# Patient Record
Sex: MALE | Race: WHITE | URBAN - METROPOLITAN AREA
[De-identification: names, ages, dates, MRNs, and addresses within clinical notes are randomized per-mention and may not be internally consistent; named-entity substitution may affect disease eponyms.]

---

## 2017-03-03 ENCOUNTER — TELEPHONE (OUTPATIENT)
Dept: NEUROLOGY | Facility: CLINIC | Age: 57
End: 2017-03-03

## 2017-03-03 NOTE — TELEPHONE ENCOUNTER
Prior Authorization Retail Medication Request  Medication/Dose: Lidocaine 5% Patch Place 2 patches onto the skin every 24 hours  Diagnosis and ICD code: Idiopathic Progressive Neuropathy G60.3  New/Renewal/Insurance Change PA:   Previously Tried and Failed Therapies:     Insurance ID (if provided):   Insurance Phone (if provided):     Any additional info from fax request:     If you received a fax notification from an outside Pharmacy:  Pharmacy Name: [facility]   Pharmacy #:[id number]  Pharmacy Fax:[phone]

## 2017-03-10 NOTE — TELEPHONE ENCOUNTER
PRIOR AUTHORIZATION DENIED    Medication: Lidocaine 5% Patch- denied    Denial Date: 3/10/2017    Denial Rational: script is denied because pt's diagnosis is not an approved indication, approved indications are, Pain associated with post-herpetic neuralgia, pain associated with diabetic neuropathy, pain associated with cancer-related neuropathy.            Appeal Information:

## 2017-03-30 NOTE — TELEPHONE ENCOUNTER
Medication Appeal Initiation    We have initiated an appeal for the requested medication:  Medication: Lidocaine 5% Patch- appeal initiated  Appeal Start Date:  3/30/2017  Insurance Company: [employer] - Phone [phone] Fax [phone]  Comments:  Faxed appeal to [phone] included is letter of medical necessity, Dr notes and denial letter

## 2017-04-10 ENCOUNTER — TELEPHONE (OUTPATIENT)
Dept: NEUROLOGY | Facility: CLINIC | Age: 57
End: 2017-04-10

## 2018-02-08 DIAGNOSIS — G60.3 IDIOPATHIC PROGRESSIVE POLYNEUROPATHY: ICD-10-CM

## 2018-02-09 RX ORDER — LIDOCAINE 50 MG/G
PATCH TOPICAL
Qty: 60 PATCH | Refills: 4 | Status: SHIPPED | OUTPATIENT
Start: 2018-02-09 | End: 2018-02-28

## 2018-02-09 NOTE — TELEPHONE ENCOUNTER
Received refill request for Lidoderm patches from [facility]. Patient has not been seen since 12/2016.

## 2018-02-28 ENCOUNTER — OFFICE VISIT (OUTPATIENT)
Dept: NEUROLOGY | Facility: CLINIC | Age: 58
End: 2018-02-28
Payer: COMMERCIAL

## 2018-02-28 VITALS
WEIGHT: 231.7 LBS | HEIGHT: 71 IN | BODY MASS INDEX: 32.44 KG/M2 | HEART RATE: 81 BPM | SYSTOLIC BLOOD PRESSURE: 144 MMHG | DIASTOLIC BLOOD PRESSURE: 92 MMHG

## 2018-02-28 DIAGNOSIS — G60.9 IDIOPATHIC SMALL FIBER PERIPHERAL NEUROPATHY: Primary | ICD-10-CM

## 2018-02-28 DIAGNOSIS — G60.9 IDIOPATHIC SMALL FIBER PERIPHERAL NEUROPATHY: ICD-10-CM

## 2018-02-28 LAB
HBA1C MFR BLD: 5.4 % (ref 4.3–6)
VIT B12 SERPL-MCNC: 467 PG/ML (ref 193–986)

## 2018-02-28 RX ORDER — LIDOCAINE 50 MG/G
2 PATCH TOPICAL EVERY 24 HOURS
Qty: 60 PATCH | Refills: 6 | Status: SHIPPED | OUTPATIENT
Start: 2018-02-28

## 2018-02-28 RX ORDER — DULOXETIN HYDROCHLORIDE 30 MG/1
30 CAPSULE, DELAYED RELEASE ORAL 2 TIMES DAILY
Qty: 60 CAPSULE | Refills: 3 | Status: SHIPPED | OUTPATIENT
Start: 2018-02-28 | End: 2018-07-05

## 2018-02-28 ASSESSMENT — PAIN SCALES - GENERAL: PAINLEVEL: NO PAIN (0)

## 2018-02-28 NOTE — MR AVS SNAPSHOT
After Visit Summary   2/28/2018    Bernaeb Moreno    MRN: [id number]           Patient Information     Date Of Birth          1960        Visit Information        Provider Department      2/28/2018 8:30 AM Pablo Zarate MD [facility]        Today's Diagnoses     Idiopathic small fiber peripheral neuropathy    -  1       Follow-ups after your visit        Follow-up notes from your care team     Return in about 1 year (around 2/28/2019).      Your next 10 appointments already scheduled     Feb 28, 2018  9:15 AM CST   LAB with  LAB   [facility] ([facility])    [address] [place] [id number] [phone]           Please do not eat 10-12 hours before your appointment if you are coming in fasting for labs on lipids, cholesterol, or glucose (sugar). This does not apply to pregnant women. Water, hot tea and black coffee (with nothing added) are okay. Do not drink other fluids, diet soda or chew gum.            Feb 27, 2019  8:00 AM CST   (Arrive by 7:45 AM)   Return Neuropathy Visit with Pablo Zarate MD   [facility] ([facility])    [address] [place] [id number] [phone]              Future tests that were ordered for you today     Open Future Orders        Priority Expected Expires Ordered    Hemoglobin A1c Routine  2/28/2019 2/28/2018    Vitamin B12 Routine  2/28/2019 2/28/2018            Who to contact     Please call your clinic at [phone] to:    Ask questions about your health    Make or cancel appointments    Discuss your medicines    Learn about your test results    Speak to your doctor            Additional Information About Your Visit        Brain Sentry Information     [employer]entry is an electronic gateway that provides easy, online access to your medical records. With [employer], you can request a clinic appointment, read your test results, renew a prescription  "or communicate with your care team.     To sign up for Click[employer] visit the website at [email or web address]   You will be asked to enter the access code listed below, as well as some personal information. Please follow the directions to create your username and password.     Your access code is: [email or web address]  Expires: 2018  6:30 AM     Your access code will  in 90 days. If you need help or a new code, please contact your [facility] or call [phone] for assistance.        Care EveryWhere ID     This is your Care EveryWhere ID. This could be used by other organizations to access your [facility] medical records  [id number]        Your Vitals Were     Pulse Height BMI (Body Mass Index)             81 1.803 m (5' 11\") 32.32 kg/m2          Blood Pressure from Last 3 Encounters:   18 (!) 144/92   16 138/84   16 (!) 158/96    Weight from Last 3 Encounters:   18 105.1 kg (231 lb 11.2 oz)   14 99.8 kg (220 lb)   14 97.5 kg (215 lb)                 Today's Medication Changes          These changes are accurate as of 18  9:02 AM.  If you have any questions, ask your nurse or doctor.               Start taking these medicines.        Dose/Directions    DULoxetine 30 MG EC capsule   Commonly known as:  CYMBALTA   Used for:  Idiopathic small fiber peripheral neuropathy   Started by:  Pablo Zarate MD        Dose:  30 mg   Take 1 capsule (30 mg) by mouth 2 times daily   Quantity:  60 capsule   Refills:  3         These medicines have changed or have updated prescriptions.        Dose/Directions    * lidocaine 5 % Patch   Commonly known as:  LIDODERM   This may have changed:  Another medication with the same name was added. Make sure you understand how and when to take each.   Used for:  Idiopathic progressive polyneuropathy   Changed by:  Pablo Zarate MD        Dose:  2 patch   Place 2 patches onto the skin every 12 hours   Quantity: "  60 patch   Refills:  3       * lidocaine 5 % Patch   Commonly known as:  LIDODERM   This may have changed:  You were already taking a medication with the same name, and this prescription was added. Make sure you understand how and when to take each.   Used for:  Idiopathic small fiber peripheral neuropathy   Changed by:  Pablo Zarate MD        Dose:  2 patch   Place 2 patches onto the skin every 24 hours   Quantity:  60 patch   Refills:  6       * Notice:  This list has 2 medication(s) that are the same as other medications prescribed for you. Read the directions carefully, and ask your doctor or other care provider to review them with you.         Where to get your medicines      These medications were sent to [facility]/pharmacy #[id number] - [place], [place] - [address], [place] [address]     Phone:  [phone]     DULoxetine 30 MG EC capsule    lidocaine 5 % Patch                Primary Care Provider Office Phone # Fax #    Rommel Gasca [phone]       [facility] [address] [facility] [address]        Equal Access to Services     [facility] HospitalFRANCOIS AH: Hadii aad ku hadasho Soomaali, waaxda luqadaha, qaybta kaalmada adeegyada, waxay idiin hayaan lee ann khhenry salas . So [facility] [phone].    ATENCIÓN: Si habla español, tiene a shaffer disposición servicios gratuitos de asistencia lingüística. [facility] [phone].    We comply with applicable federal civil rights laws and [place] laws. We do not discriminate on the basis of race, color, national origin, age, disability, sex, sexual orientation, or gender identity.            Thank you!     Thank you for choosing [facility]UROLOGY  for your care. Our goal is always to provide you with excellent care. Hearing back from our patients is one way we can continue to improve our services. Please take a few minutes to complete the written survey that you may receive in the mail  after your visit with us. Thank you!             Your Updated Medication List - Protect others around you: Learn how to safely use, store and throw away your medicines at www.disposemymeds.org.          This list is accurate as of 2/28/18  9:02 AM.  Always use your most recent med list.                   Brand Name Dispense Instructions for use Diagnosis    AMBIEN PO      Take 10 mg by mouth    Sensory disturbance       DULoxetine 30 MG EC capsule    CYMBALTA    60 capsule    Take 1 capsule (30 mg) by mouth 2 times daily    Idiopathic small fiber peripheral neuropathy       * lidocaine 5 % Patch    LIDODERM    60 patch    Place 2 patches onto the skin every 12 hours    Idiopathic progressive polyneuropathy       * lidocaine 5 % Patch    LIDODERM    60 patch    Place 2 patches onto the skin every 24 hours    Idiopathic small fiber peripheral neuropathy       * Notice:  This list has 2 medication(s) that are the same as other medications prescribed for you. Read the directions carefully, and ask your doctor or other care provider to review them with you.

## 2018-02-28 NOTE — PROGRESS NOTES
Neuropathy history:   Bernabe Moreno is a 57 year old man with a probable mild distal small fiber neuropathy. Onset was at least 2012 or 2013. At that time he developed paresthesias (tingling) and dysesthesias (pins) in both feet. Course has been stable to very slowly worsening. NCS 6/14 did not show any clear evidence of large fiber involvement. Prior work up included borderline low B12. Other studies as below. He has taken oral B12 replacement since June 2014. Prior symptomatic medications include gabapentin (summer 2014). He thinks he took 300 mg TID. It was not helpful and so he stopped it in September 2014. In early December 2014 he started Amitriptyline. He thinks this was probably been helpful, but not completely sure and so stopped it. He has used lidocaine patches at night. He has found these helpful.     Interval history:  I last saw him 12/6/16. Over the last year he is largely unchanged. He continues to have paresthesias and burning mainly on the top of the feet. The early evening and bed time are the most problematic, The affected area remains just the distal 1/2 of both feet. The severity and location is stable. He has reduced his exercise over the last year and has gained weight. He stopped the lidocaine patches a few weeks ago. He though these were helpful. He stopped them because he ran out. He stopped taking B12.     Prior pertinent laboratory work-up:  5/14: B12 253. Normal copper, zinc, MMA, paraneoplastic panel ([place]), CRP, ESR, SSa, SSb, serum/urine IF, TSH, Hba1c  4/16/15: GTT fasting 95, 2 hr 142 (N )  5/15: Skin biopsy showed IENFD of 5.05 at the foot (slightly reduced) and 7.3 at the calf (WNL).     Prior pertinent radiology work-up:  12/14: MRI LS spine apparently showed some degenerative changes (by descriptive report in Dr. Andre note)    Prior electrophysiologic work-up:  6/14: Nerve conduction studies/EMG were performed by Dr. Andre. I personally reviewed the data.  "Peroneal and tibial motor as well as sural sensory responses were normal. There was no evidence of a large fiber polyneuropathy.     Past Medical History:   Small fiber neuropathy  Vitamin B12 deficiency  ANKIT   SDH    Past Surgical History:  SDH evacuation  Aneurysm clip    Family history:   There is no known family history of hereditary neuropathies or other neuromuscular disorders.    Social History:   Rare alcohol. He denies tobacco or illicit drug use. There is no known exposure to toxins or heavy metals.     Medical Allergies: NKDA    Current Medications:   Lidoderm patches    Review of Systems: A review of systems was obtained and was negative except for what was noted above.    Physical examination:   BP (!) 144/92 (BP Location: Left arm, Patient Position: Chair, Cuff Size: Adult Regular)  Pulse 81  Ht 1.803 m (5' 11\")  Wt 105.1 kg (231 lb 11.2 oz)  BMI 32.32 kg/m2    General Appearance: NAD    Skin: There are no rashes or other skin lesions.    Musculoskeletal: Mild pes cavus.     Neurologic examination:    Mental status: Patient is alert, attentive, and oriented x 3. Language is coherent and fluent without dysarthria or aphasia. Memory, comprehension and ability to follow commands were intact.     Cranial nerves: Pupils round and reacted to light. Extraocular movements were full. There was no face, jaw, palate or tongue weakness or atrophy. Hearing was grossly intact.     Motor exam: No atrophy or fasciculations. Manual muscle testing revealed full arm and leg strength.     Complex motor skills: No ataxia.     Sensory exam:  Pin is reduced but not absent in the toes. Normal elsewhere. Vibration slightly reduced in the toes.     Gait was narrow and stable.     Deep tendon reflexes:   Right   Left     Triceps   2   2     Biceps   2   2     Brachioradialis   2   2     Knee jerk   2   2     Ankle jerk   2   2       Assessment:   Bernabe Moreno is a 57 year old man with a mild length-dependant small-fiber " neuropathy. Diagnosis supported by symptoms and skin biopsy. Examination remains essentially normal. His neuropathy remains stable. We discussed symptomatic management. Will proceed as below. All questions answered.      Plan:   1. Labs: Hba1c and B12 level today. If low will ask him to resume B12 oral replacement vs SC or IM replacment.   2. Continue lidoderm patches. Refill provided.   3. Added Cymbalta 60 mg daily. Risks, side effects discussed. Prescription provided. If no help then will try lyrica next.   4. Encouraged resumption of ability appropriate exercise  5. Follow up in 12 months  ----    7/224/18: Received note from him. Cymbalta not helpful. Will start lyrica, escalating to 75 mg TID.

## 2018-02-28 NOTE — LETTER
2/28/2018       RE: Bernabe Moreno  [address] [place] [address]     Dear Colleague,    Thank you for referring your patient, Bernabe Mroeno, to the [facility] at [facility]. Please see a copy of my visit note below.    Neuropathy history:   Bernabe Moreno is a 57 year old man with a probable mild distal small fiber neuropathy. Onset was at least 2012 or 2013. At that time he developed paresthesias (tingling) and dysesthesias (pins) in both feet. Course has been stable to very slowly worsening. NCS 6/14 did not show any clear evidence of large fiber involvement. Prior work up included borderline low B12. Other studies as below. He has taken oral B12 replacement since June 2014. Prior symptomatic medications include gabapentin (summer 2014). He thinks he took 300 mg TID. It was not helpful and so he stopped it in September 2014. In early December 2014 he started Amitriptyline. He thinks this was probably been helpful, but not completely sure and so stopped it. He has used lidocaine patches at night. He has found these helpful.     Interval history:  I last saw him 12/6/16. Over the last year he is largely unchanged. He continues to have paresthesias and burning mainly on the top of the feet. The early evening and bed time are the most problematic, The affected area remains just the distal 1/2 of both feet. The severity and location is stable. He has reduced his exercise over the last year and has gained weight. He stopped the lidocaine patches a few weeks ago. He though these were helpful. He stopped them because he ran out. He stopped taking B12.     Prior pertinent laboratory work-up:  5/14: B12 253. Normal copper, zinc, MMA, paraneoplastic panel ([place]), CRP, ESR, SSa, SSb, serum/urine IF, TSH, Hba1c  4/16/15: GTT fasting 95, 2 hr 142 (N )  5/15: Skin biopsy showed IENFD of 5.05 at the foot (slightly reduced) and 7.3 at the calf (WNL).     Prior pertinent  "radiology work-up:  12/14: MRI LS spine apparently showed some degenerative changes (by descriptive report in Dr. Andre note)    Prior electrophysiologic work-up:  6/14: Nerve conduction studies/EMG were performed by Dr. Andre. I personally reviewed the data. Peroneal and tibial motor as well as sural sensory responses were normal. There was no evidence of a large fiber polyneuropathy.     Past Medical History:   Small fiber neuropathy  Vitamin B12 deficiency  ANKIT   SDH    Past Surgical History:  SDH evacuation  Aneurysm clip    Family history:   There is no known family history of hereditary neuropathies or other neuromuscular disorders.    Social History:   Rare alcohol. He denies tobacco or illicit drug use. There is no known exposure to toxins or heavy metals.     Medical Allergies: NKDA    Current Medications:   Lidoderm patches    Review of Systems: A review of systems was obtained and was negative except for what was noted above.    Physical examination:   BP (!) 144/92 (BP Location: Left arm, Patient Position: Chair, Cuff Size: Adult Regular)  Pulse 81  Ht 1.803 m (5' 11\")  Wt 105.1 kg (231 lb 11.2 oz)  BMI 32.32 kg/m2    General Appearance: NAD    Skin: There are no rashes or other skin lesions.    Musculoskeletal: Mild pes cavus.     Neurologic examination:    Mental status: Patient is alert, attentive, and oriented x 3. Language is coherent and fluent without dysarthria or aphasia. Memory, comprehension and ability to follow commands were intact.     Cranial nerves: Pupils round and reacted to light. Extraocular movements were full. There was no face, jaw, palate or tongue weakness or atrophy. Hearing was grossly intact.     Motor exam: No atrophy or fasciculations. Manual muscle testing revealed full arm and leg strength.     Complex motor skills: No ataxia.     Sensory exam:  Pin is reduced but not absent in the toes. Normal elsewhere. Vibration slightly reduced in the toes.     Gait was " narrow and stable.     Deep tendon reflexes:   Right   Left     Triceps   2   2     Biceps   2   2     Brachioradialis   2   2     Knee jerk   2   2     Ankle jerk   2   2       Assessment:   Bernabe Moreno is a 57 year old man with a mild length-dependant small-fiber neuropathy. Diagnosis supported by symptoms and skin biopsy. Examination remains essentially normal. His neuropathy remains stable. We discussed symptomatic management. Will proceed as below.   All questions answered.      Plan:   1. Labs: Hba1c and B12 level today. If low will ask him to resume B12 oral replacement vs SC or IM replacment.   2. Continue lidoderm patches. Refill provided.   3. Added Cymbalta 60 mg daily. Risks, side effects discussed. Prescription provided. If no help then will try lyrica next.   4. Encouraged resumption of ability appropriate exercise  5. Follow up in 12 months  ----        Again, thank you for allowing me to participate in the care of your patient.      Sincerely,    Pablo Zarate MD

## 2018-07-05 DIAGNOSIS — G60.9 IDIOPATHIC SMALL FIBER PERIPHERAL NEUROPATHY: ICD-10-CM

## 2018-07-05 RX ORDER — DULOXETIN HYDROCHLORIDE 30 MG/1
CAPSULE, DELAYED RELEASE ORAL
Qty: 60 CAPSULE | Refills: 3 | Status: SHIPPED | OUTPATIENT
Start: 2018-07-05

## 2018-07-23 ENCOUNTER — TELEPHONE (OUTPATIENT)
Dept: NEUROLOGY | Facility: CLINIC | Age: 58
End: 2018-07-23

## 2018-07-23 NOTE — TELEPHONE ENCOUNTER
[facility] Call Center    Phone Message    May a detailed message be left on voicemail: no    Reason for Call: Other: Pt calling in to return call from Jocelyn, who actually called his wife Rachel. Please call him at [phone]     Action Taken: Message routed to:  [facility] ([facility]

## 2018-07-23 NOTE — TELEPHONE ENCOUNTER
[facility] Call Center    Phone Message    May a detailed message be left on voicemail: no    Reason for Call: Medication Question or concern regarding medication   Prescription Clarification  Name of Medication: DULoxetine (CYMBALTA) 30 MG EC capsule  Prescribing Provider: Dr. Pablo Zarate   Ph[facility]: [facility]   What on the order needs clarification? Pt wanted to let Dr. Zarate know the medication is not working and this past weekend he said his foot pain was chronic enough to interrupt his sleeping. He would like Dr. Zarate to prescribe a different medication that may work better. Please contact Pt to discuss.          Action Taken: Message routed to:  [facility] ([facility]): [facility]ULT [facility]

## 2018-07-23 NOTE — TELEPHONE ENCOUNTER
Called patient to discuss the message below, there was no answer, left M requesting a call back. Per Dr. Zarate's last office visit note on 2/28/18: Added Cymbalta 60 mg daily. Risks, side effects discussed. Prescription provided. If no help then will try lyrica next.

## 2018-07-24 RX ORDER — PREGABALIN 75 MG/1
75 CAPSULE ORAL 3 TIMES DAILY
Qty: 90 CAPSULE | Refills: 3 | Status: SHIPPED | OUTPATIENT
Start: 2018-07-24

## 2018-07-24 NOTE — TELEPHONE ENCOUNTER
Per Dr. Zarate: Thanks. It is ok to start lyrica. I was not able to reach him by phone but did send a lyrica prescription to his pharmacy. When you talk to him will please let him know. Also remind him of lyrica side effects, which include may include dizziness, drowsiness, weight gain, and tremor.    Called patient and left a VMM letting him know Rx was called in to his pharmacy and stated the side effects of Lyrica. Asked for a call back with questions. Called in Lyrica Rx to [facility] per Dr. Zarate.

## 2020-11-22 ENCOUNTER — HEALTH MAINTENANCE LETTER (OUTPATIENT)
Age: 60
End: 2020-11-22

## 2021-09-19 ENCOUNTER — HEALTH MAINTENANCE LETTER (OUTPATIENT)
Age: 61
End: 2021-09-19

## 2022-01-09 ENCOUNTER — HEALTH MAINTENANCE LETTER (OUTPATIENT)
Age: 62
End: 2022-01-09

## 2022-11-20 ENCOUNTER — HEALTH MAINTENANCE LETTER (OUTPATIENT)
Age: 62
End: 2022-11-20

## 2023-04-16 ENCOUNTER — HEALTH MAINTENANCE LETTER (OUTPATIENT)
Age: 63
End: 2023-04-16